# Patient Record
Sex: FEMALE | Race: WHITE | NOT HISPANIC OR LATINO | Employment: PART TIME | URBAN - METROPOLITAN AREA
[De-identification: names, ages, dates, MRNs, and addresses within clinical notes are randomized per-mention and may not be internally consistent; named-entity substitution may affect disease eponyms.]

---

## 2018-12-03 ENCOUNTER — OFFICE VISIT (OUTPATIENT)
Dept: GASTROENTEROLOGY | Facility: CLINIC | Age: 48
End: 2018-12-03

## 2018-12-03 VITALS — HEIGHT: 71 IN | BODY MASS INDEX: 31.5 KG/M2 | WEIGHT: 225 LBS

## 2018-12-03 DIAGNOSIS — K21.00 GASTROESOPHAGEAL REFLUX DISEASE WITH ESOPHAGITIS: Primary | ICD-10-CM

## 2018-12-03 PROCEDURE — 99213 OFFICE O/P EST LOW 20 MIN: CPT | Performed by: INTERNAL MEDICINE

## 2018-12-03 RX ORDER — NORETHINDRONE ACETATE AND ETHINYL ESTRADIOL, ETHINYL ESTRADIOL AND FERROUS FUMARATE 1MG-10(24)
KIT ORAL
COMMUNITY
Start: 2018-11-19

## 2018-12-03 RX ORDER — DULOXETIN HYDROCHLORIDE 60 MG/1
CAPSULE, DELAYED RELEASE ORAL
COMMUNITY
Start: 2018-11-19

## 2018-12-03 RX ORDER — OMEPRAZOLE 40 MG/1
CAPSULE, DELAYED RELEASE ORAL
COMMUNITY
Start: 2018-11-30 | End: 2019-09-30 | Stop reason: SDUPTHER

## 2018-12-03 RX ORDER — LEVOTHYROXINE SODIUM 0.05 MG/1
TABLET ORAL
COMMUNITY
Start: 2018-11-19

## 2018-12-03 NOTE — PROGRESS NOTES
PCP:  Bethanie Myers APRN     Chief Complaint   Patient presents with   • Follow-up     follow up EGD        HPI   The patient is known to me a 47-year-old female with reflux.  Her symptoms have dramatically improved.  She has only had 1 episode since I last saw her.  She is very happy with the Prilosec.  She had a colonoscopy about 10 years ago.  This was for irritable bowel.  She has no first-degree relatives with colorectal neoplasia to her knowledge.  Allergies   Allergen Reactions   • Doxycycline Unknown (See Comments)     Unknown    • Erythromycin    • Penicillins    • Sulfa Antibiotics    • Tetracyclines & Related           Current Outpatient Medications:   •  atenolol (TENORMIN) 50 MG tablet, Take 50 mg by mouth daily., Disp: , Rfl:   •  doxepin (SINEquan) 75 MG capsule, Take 75 mg by mouth every night., Disp: , Rfl:   •  DULoxetine (CYMBALTA) 60 MG capsule, , Disp: , Rfl:   •  levothyroxine (SYNTHROID, LEVOTHROID) 50 MCG tablet, , Disp: , Rfl:   •  LO LOESTRIN FE 1 MG-10 MCG / 10 MCG tablet, , Disp: , Rfl:   •  LORazepam (ATIVAN) 1 MG tablet, Take 1 mg by mouth every 8 (eight) hours as needed for anxiety., Disp: , Rfl:   •  omeprazole (priLOSEC) 40 MG capsule, , Disp: , Rfl:     Past Medical History:   Diagnosis Date   • Anxiety    • Endometriosis    • Hx of skin cancer, basal cell    • Hypertension    • Hypoglycemia    • Hypothyroidism    • Mitral valve prolapse    • Retinal detachment        Past Surgical History:   Procedure Laterality Date   • MOLE REMOVAL     • RETINAL DETACHMENT SURGERY     • TUBAL ABDOMINAL LIGATION          Social History     Socioeconomic History   • Marital status: Single     Spouse name: Not on file   • Number of children: Not on file   • Years of education: Not on file   • Highest education level: Not on file   Social Needs   • Financial resource strain: Not on file   • Food insecurity - worry: Not on file   • Food insecurity - inability: Not on file   • Transportation needs  - medical: Not on file   • Transportation needs - non-medical: Not on file   Occupational History   • Not on file   Tobacco Use   • Smoking status: Former Smoker   Substance and Sexual Activity   • Alcohol use: Yes     Comment: occassional   • Drug use: No   • Sexual activity: Not on file   Other Topics Concern   • Not on file   Social History Narrative   • Not on file        Family History   Problem Relation Age of Onset   • Colon cancer Paternal Grandfather         Review of Systems   Constitutional: Negative for unexpected weight loss.   HENT: Negative for trouble swallowing.    Eyes: Negative.    Respiratory: Negative.    Gastrointestinal: Negative for abdominal distention, abdominal pain, anal bleeding, blood in stool, constipation, diarrhea, nausea, rectal pain, vomiting, GERD and indigestion.   Endocrine: Negative.    Genitourinary: Negative.    Musculoskeletal: Negative.    Skin: Negative.    Allergic/Immunologic: Negative.    Neurological: Negative.    Hematological: Negative.    Psychiatric/Behavioral: Negative.         There were no vitals filed for this visit.     Physical Exam   General Appearance: Alert, in no acute distress   Head: Normocephalic, without obvious abnormality, atraumatic   Eyes: Lids and lashes normal, conjunctivae and sclerae normal, no icterus, no pallor, corneas clear, PERRLA   Extremities: Moves all extremities well, no edema, no cyanosis, no redness   Skin: No bleeding, bruising or rash   Neurologic: Cranial nerves 2 - 12 grossly intact, no focal deficits       Dorene was seen today for follow-up.    Diagnoses and all orders for this visit:    Gastroesophageal reflux disease with esophagitis    She has had a dramatic improvement with the Prilosec.  She does have a small hiatal hernia and we discussed that.  Given the sloughing of the mucosa in the esophagus, I think it would be best at some point in 6 months or year to reevaluate.  It had a very benign appearance however.  She is  symptomatically doing much better.  From a screening standpoint, she will need a colonoscopy as she is age 47.  The new [employer] guidelines are to start screening at age 45.  She will get that scheduled in the next few months.     Keith Washington MD

## 2019-02-13 ENCOUNTER — APPOINTMENT (OUTPATIENT)
Dept: GENERAL RADIOLOGY | Facility: HOSPITAL | Age: 49
End: 2019-02-13

## 2019-02-13 ENCOUNTER — HOSPITAL ENCOUNTER (EMERGENCY)
Facility: HOSPITAL | Age: 49
Discharge: HOME | End: 2019-02-13
Attending: EMERGENCY MEDICINE | Admitting: EMERGENCY MEDICINE

## 2019-02-13 VITALS
TEMPERATURE: 98.1 F | WEIGHT: 230 LBS | HEART RATE: 94 BPM | OXYGEN SATURATION: 99 % | RESPIRATION RATE: 16 BRPM | SYSTOLIC BLOOD PRESSURE: 196 MMHG | HEIGHT: 71 IN | BODY MASS INDEX: 32.2 KG/M2 | DIASTOLIC BLOOD PRESSURE: 108 MMHG

## 2019-02-13 DIAGNOSIS — S80.211A ABRASION, RIGHT KNEE, INITIAL ENCOUNTER: ICD-10-CM

## 2019-02-13 DIAGNOSIS — S81.011A LACERATION OF RIGHT KNEE, INITIAL ENCOUNTER: Primary | ICD-10-CM

## 2019-02-13 DIAGNOSIS — S80.01XA CONTUSION OF RIGHT KNEE, INITIAL ENCOUNTER: ICD-10-CM

## 2019-02-13 PROCEDURE — 73560 X-RAY EXAM OF KNEE 1 OR 2: CPT

## 2019-02-13 PROCEDURE — 99283 EMERGENCY DEPT VISIT LOW MDM: CPT

## 2019-02-13 RX ORDER — HYDROCODONE BITARTRATE AND ACETAMINOPHEN 5; 325 MG/1; MG/1
1 TABLET ORAL ONCE
Status: COMPLETED | OUTPATIENT
Start: 2019-02-13 | End: 2019-02-13

## 2019-02-13 RX ORDER — ONDANSETRON 4 MG/1
4 TABLET, FILM COATED ORAL EVERY 8 HOURS PRN
Qty: 20 TABLET | Refills: 0 | Status: SHIPPED | OUTPATIENT
Start: 2019-02-13

## 2019-02-13 RX ORDER — HYDROCODONE BITARTRATE AND ACETAMINOPHEN 5; 325 MG/1; MG/1
1 TABLET ORAL EVERY 6 HOURS PRN
Qty: 15 TABLET | Refills: 0 | Status: SHIPPED | OUTPATIENT
Start: 2019-02-13 | End: 2020-01-27

## 2019-02-13 RX ORDER — BACITRACIN, NEOMYCIN, POLYMYXIN B 400; 3.5; 5 [USP'U]/G; MG/G; [USP'U]/G
1 OINTMENT TOPICAL ONCE
Status: COMPLETED | OUTPATIENT
Start: 2019-02-13 | End: 2019-02-13

## 2019-02-13 RX ORDER — ONDANSETRON 4 MG/1
4 TABLET, ORALLY DISINTEGRATING ORAL ONCE
Status: COMPLETED | OUTPATIENT
Start: 2019-02-13 | End: 2019-02-13

## 2019-02-13 RX ADMIN — HYDROCODONE BITARTRATE AND ACETAMINOPHEN 1 TABLET: 5; 325 TABLET ORAL at 21:20

## 2019-02-13 RX ADMIN — BACITRACIN, NEOMYCIN, POLYMYXIN B 1 G: 400; 3.5; 5 OINTMENT TOPICAL at 21:47

## 2019-02-13 RX ADMIN — ONDANSETRON 4 MG: 4 TABLET, ORALLY DISINTEGRATING ORAL at 21:20

## 2019-02-14 NOTE — ED PROVIDER NOTES
Subjective   48-year-old female presents to emergency department with laceration/abrasion to her right knee.  She fell, tripping over a loose brick, striking her knee on the edge of a concrete walkway.  Pain with ambulation, no active bleeding currently.  No other symptoms or injuries.        Illness   Location:  R knee  Quality:  Aching / burning  Severity:  Moderate  Onset quality:  Sudden  Duration:  2 hours  Timing:  Constant  Progression:  Waxing and waning  Chronicity:  New  Context:  Per HPI  Relieved by:  Per HPI  Worsened by:  Per HPI  Ineffective treatments:  Per HPI  Associated symptoms: no chest pain, no fever, no headaches, no nausea and no shortness of breath        Review of Systems   Constitutional: Negative for fever.   Respiratory: Negative for shortness of breath.    Cardiovascular: Negative for chest pain.   Gastrointestinal: Negative for nausea.   Musculoskeletal:        R knee injury , abrasion, laceration   Skin: Positive for wound.   Neurological: Negative for headaches.   All other systems reviewed and are negative.      Past Medical History:   Diagnosis Date   • Anxiety    • Endometriosis    • Hx of skin cancer, basal cell    • Hypertension    • Hypoglycemia    • Hypothyroidism    • Mitral valve prolapse    • Retinal detachment        Allergies   Allergen Reactions   • Doxycycline Unknown (See Comments)     Unknown    • Erythromycin    • Penicillins    • Sulfa Antibiotics    • Tetracyclines & Related        Past Surgical History:   Procedure Laterality Date   • MOLE REMOVAL     • RETINAL DETACHMENT SURGERY     • TUBAL ABDOMINAL LIGATION         Family History   Problem Relation Age of Onset   • Colon cancer Paternal Grandfather        Social History     Socioeconomic History   • Marital status: Single     Spouse name: Not on file   • Number of children: Not on file   • Years of education: Not on file   • Highest education level: Not on file   Tobacco Use   • Smoking status: Former Smoker    • Smokeless tobacco: Never Used   Substance and Sexual Activity   • Alcohol use: Yes     Comment: occassional   • Drug use: No           Objective   Physical Exam   Constitutional: She is oriented to person, place, and time. She appears well-developed and well-nourished. No distress.   HENT:   Head: Normocephalic and atraumatic.   Right Ear: External ear normal.   Left Ear: External ear normal.   Nose: Nose normal.   Mouth/Throat: Oropharynx is clear and moist. No oropharyngeal exudate.   Eyes: Conjunctivae and EOM are normal. Pupils are equal, round, and reactive to light. Right eye exhibits no discharge. Left eye exhibits no discharge. No scleral icterus.   Neck: Normal range of motion. Neck supple. No JVD present. No tracheal deviation present. No thyromegaly present.   Cardiovascular: Normal rate.   Pulmonary/Chest: Effort normal. No stridor. No respiratory distress.   Abdominal: Soft. She exhibits no distension.   Musculoskeletal: She exhibits tenderness. She exhibits no edema or deformity.   Right knee with abrasion and soft tissue contusion across the prepatellar aspect, with soft tissue swelling inferiorly and laterally, underlying a 2 cm superficial flap-type linear laceration, partial-thickness.  No bony crepitus or step-off.  Range of motion is relatively full.  She is ambulatory to the bedside.  Neurovascular status intact distally.   Neurological: She is alert and oriented to person, place, and time. No cranial nerve deficit. She exhibits normal muscle tone. Coordination normal.   Skin: Skin is warm and dry. No rash noted. She is not diaphoretic. No erythema. No pallor.   Psychiatric: She has a normal mood and affect. Her behavior is normal. Judgment and thought content normal.   Nursing note and vitals reviewed.      Laceration Repair  Date/Time: 2/13/2019 10:08 PM  Performed by: Kendell Gaines PA-C  Authorized by: Meliton Gonzales MD     Consent:     Consent obtained:  Verbal    Consent given  "by:  Patient    Risks discussed:  Infection  Anesthesia (see MAR for exact dosages):     Anesthesia method:  Local infiltration    Local anesthetic:  Lidocaine 1% w/o epi  Laceration details:     Location: Right knee.    Length (cm):  2    Depth (mm):  2  Exploration:     Wound exploration: wound explored through full range of motion      Wound extent: no muscle damage noted and no underlying fracture noted    Treatment:     Area cleansed with:  Betadine    Amount of cleaning:  Standard    Irrigation solution:  Sterile saline    Irrigation volume:  20    Irrigation method:  Syringe    Visualized foreign bodies/material removed: no    Skin repair:     Repair method:  Sutures    Suture size:  5-0    Suture material:  Nylon    Suture technique:  Simple interrupted    Number of sutures:  4  Approximation:     Approximation:  Close    Vermilion border: well-aligned    Post-procedure details:     Dressing:  Antibiotic ointment and non-adherent dressing    Patient tolerance of procedure:  Tolerated well, no immediate complications               ED Course        No results found for this or any previous visit (from the past 24 hour(s)).  Note: In addition to lab results from this visit, the labs listed above may include labs taken at another facility or during a different encounter within the last 24 hours. Please correlate lab times with ED admission and discharge times for further clarification of the services performed during this visit.    XR Knee 1 or 2 View Right   Final Result   No acute fracture or dislocation.       THIS DOCUMENT HAS BEEN ELECTRONICALLY SIGNED BY MICHAEL CALIXTO MD        Vitals:    02/13/19 1952   BP: (!) 196/108   BP Location: Left arm   Patient Position: Sitting   Pulse: 94   Resp: 16   Temp: 98.1 °F (36.7 °C)   TempSrc: Oral   SpO2: 99%   Weight: 104 kg (230 lb)   Height: 180.3 cm (71\")     Medications   HYDROcodone-acetaminophen (NORCO) 5-325 MG per tablet 1 tablet (1 tablet Oral Given 2/13/19 " 2120)   ondansetron ODT (ZOFRAN-ODT) disintegrating tablet 4 mg (4 mg Oral Given 2/13/19 2120)   neomycin-bacitracin-polymyxin (NEOSPORIN) ointment 1 g (1 g Topical Given 2/13/19 2147)     ECG/EMG Results (last 24 hours)     ** No results found for the last 24 hours. **        No orders to display               MDM      Final diagnoses:   Laceration of right knee, initial encounter   Abrasion, right knee, initial encounter   Contusion of right knee, initial encounter            Kendell Gaines PA-C  02/13/19 [id number]

## 2019-02-14 NOTE — DISCHARGE INSTRUCTIONS
Keep wound clean and dry.  Apply cold compresses every few hours for 20-30 minutes.  Recheck in 2 days, suture removal in 7-10 days.  Observe for any sign of infection including but not limited to redness pus pain swelling fever red streaks, increased pain with weightbearing, drainage.  Return to emergency department immediately if any change or worsening of symptoms.

## 2019-02-24 ENCOUNTER — HOSPITAL ENCOUNTER (EMERGENCY)
Facility: HOSPITAL | Age: 49
Discharge: HOME | End: 2019-02-24

## 2019-02-24 VITALS
RESPIRATION RATE: 20 BRPM | BODY MASS INDEX: 32.2 KG/M2 | WEIGHT: 230 LBS | OXYGEN SATURATION: 100 % | DIASTOLIC BLOOD PRESSURE: 99 MMHG | HEIGHT: 71 IN | HEART RATE: 102 BPM | SYSTOLIC BLOOD PRESSURE: 185 MMHG | TEMPERATURE: 98.5 F

## 2019-02-24 PROCEDURE — 99201: CPT

## 2019-03-25 RX ORDER — SODIUM, POTASSIUM,MAG SULFATES 17.5-3.13G
SOLUTION, RECONSTITUTED, ORAL ORAL
Qty: 1 BOTTLE | Refills: 0 | Status: SHIPPED | OUTPATIENT
Start: 2019-03-25 | End: 2020-01-27

## 2019-04-02 ENCOUNTER — OUTSIDE FACILITY SERVICE (OUTPATIENT)
Dept: GASTROENTEROLOGY | Facility: CLINIC | Age: 49
End: 2019-04-02

## 2019-04-02 PROCEDURE — G0105 COLORECTAL SCRN; HI RISK IND: HCPCS | Performed by: INTERNAL MEDICINE

## 2019-09-30 RX ORDER — OMEPRAZOLE 40 MG/1
CAPSULE, DELAYED RELEASE ORAL
Qty: 30 CAPSULE | Refills: 6 | Status: SHIPPED | OUTPATIENT
Start: 2019-09-30 | End: 2020-07-02

## 2020-01-27 ENCOUNTER — LAB (OUTPATIENT)
Dept: LAB | Facility: HOSPITAL | Age: 50
End: 2020-01-27

## 2020-01-27 ENCOUNTER — OFFICE VISIT (OUTPATIENT)
Dept: GASTROENTEROLOGY | Facility: CLINIC | Age: 50
End: 2020-01-27

## 2020-01-27 VITALS
WEIGHT: 239 LBS | DIASTOLIC BLOOD PRESSURE: 99 MMHG | HEART RATE: 82 BPM | SYSTOLIC BLOOD PRESSURE: 178 MMHG | BODY MASS INDEX: 33.33 KG/M2

## 2020-01-27 DIAGNOSIS — K76.0 FATTY LIVER: ICD-10-CM

## 2020-01-27 DIAGNOSIS — K20.80 ESOPHAGITIS DISSECANS SUPERFICIALIS: ICD-10-CM

## 2020-01-27 DIAGNOSIS — R79.89 ELEVATED LIVER FUNCTION TESTS: Primary | ICD-10-CM

## 2020-01-27 DIAGNOSIS — K21.9 GASTROESOPHAGEAL REFLUX DISEASE, ESOPHAGITIS PRESENCE NOT SPECIFIED: ICD-10-CM

## 2020-01-27 DIAGNOSIS — R79.89 ELEVATED LIVER FUNCTION TESTS: ICD-10-CM

## 2020-01-27 LAB
ALBUMIN SERPL-MCNC: 4.4 G/DL (ref 3.5–5.2)
ALP SERPL-CCNC: 140 U/L (ref 39–117)
ALT SERPL W P-5'-P-CCNC: 41 U/L (ref 1–33)
AST SERPL-CCNC: 30 U/L (ref 1–32)
BILIRUB CONJ SERPL-MCNC: <0.2 MG/DL (ref 0.2–0.3)
BILIRUB INDIRECT SERPL-MCNC: ABNORMAL MG/DL
BILIRUB SERPL-MCNC: <0.2 MG/DL (ref 0.2–1.2)
CERULOPLASMIN SERPL-MCNC: 42 MG/DL (ref 19–39)
FERRITIN SERPL-MCNC: 127 NG/ML (ref 13–150)
GGT SERPL-CCNC: 140 U/L (ref 5–36)
IRON 24H UR-MRATE: 89 MCG/DL (ref 37–145)
PROT SERPL-MCNC: 7.6 G/DL (ref 6–8.5)

## 2020-01-27 PROCEDURE — 86235 NUCLEAR ANTIGEN ANTIBODY: CPT

## 2020-01-27 PROCEDURE — 86038 ANTINUCLEAR ANTIBODIES: CPT

## 2020-01-27 PROCEDURE — 36415 COLL VENOUS BLD VENIPUNCTURE: CPT

## 2020-01-27 PROCEDURE — 83540 ASSAY OF IRON: CPT

## 2020-01-27 PROCEDURE — 82728 ASSAY OF FERRITIN: CPT

## 2020-01-27 PROCEDURE — 83516 IMMUNOASSAY NONANTIBODY: CPT

## 2020-01-27 PROCEDURE — 86704 HEP B CORE ANTIBODY TOTAL: CPT

## 2020-01-27 PROCEDURE — 82977 ASSAY OF GGT: CPT

## 2020-01-27 PROCEDURE — 87340 HEPATITIS B SURFACE AG IA: CPT

## 2020-01-27 PROCEDURE — 86708 HEPATITIS A ANTIBODY: CPT

## 2020-01-27 PROCEDURE — 84466 ASSAY OF TRANSFERRIN: CPT

## 2020-01-27 PROCEDURE — 82103 ALPHA-1-ANTITRYPSIN TOTAL: CPT

## 2020-01-27 PROCEDURE — 80076 HEPATIC FUNCTION PANEL: CPT | Performed by: INTERNAL MEDICINE

## 2020-01-27 PROCEDURE — 86376 MICROSOMAL ANTIBODY EACH: CPT

## 2020-01-27 PROCEDURE — 86225 DNA ANTIBODY NATIVE: CPT

## 2020-01-27 PROCEDURE — 82104 ALPHA-1-ANTITRYPSIN PHENO: CPT

## 2020-01-27 PROCEDURE — 82390 ASSAY OF CERULOPLASMIN: CPT

## 2020-01-27 PROCEDURE — 86706 HEP B SURFACE ANTIBODY: CPT

## 2020-01-27 PROCEDURE — 99214 OFFICE O/P EST MOD 30 MIN: CPT | Performed by: INTERNAL MEDICINE

## 2020-01-27 PROCEDURE — 86803 HEPATITIS C AB TEST: CPT

## 2020-01-27 NOTE — PROGRESS NOTES
PCP:  Bethanie Myers, TERRY     No referring provider defined for this encounter.    Chief Complaint   Patient presents with   • Follow-up     follow up GERD        HPI   Patient has several issues.  She has gastroesophageal reflux disease which appears to be fairly well controlled at the moment.  She is on omeprazole.  She did have some esophagitis dissecans and some blunting of the mucosa superficially.  In September she had a cholecystectomy for sludge and what sounds like a dysfunctional gallbladder.  Her liver chemistries have been noted to be elevated.  In September 2019 her bilirubin was normal at 0.4.  Her alkaline phosphatase was elevated at 119.  Her AST was 41.  Her ALT was 43.  On 11/13/2019 her bilirubin was normal at 0.3.  Her alkaline phosphatase was 139.  Her ALT was 40.  Her AST was 33.  On 1/7/2020 her ALT was 45, AST 39, alk phos 131, albumin 4.5, and bilirubin 0.4.  She has no history of IV drug use, homemade tattoos, transfusions, or exposure to someone with hepatitis.  She believes she had hepatitis vaccination.  Her hemoglobin was 12.9 hematocrit 39.4 and white count 7.5 thousand.  She did have an ultrasound which showed fatty liver.  She is post cholecystectomy.  Her recent colonoscopy was normal and she is due for reevaluation in April of 2024.    Allergies   Allergen Reactions   • Doxycycline Unknown (See Comments)     Unknown    • Erythromycin    • Penicillins    • Sulfa Antibiotics    • Tetracyclines & Related           Current Outpatient Medications:   •  atenolol (TENORMIN) 50 MG tablet, Take 50 mg by mouth daily., Disp: , Rfl:   •  doxepin (SINEquan) 75 MG capsule, Take 75 mg by mouth every night., Disp: , Rfl:   •  DULoxetine (CYMBALTA) 60 MG capsule, , Disp: , Rfl:   •  levothyroxine (SYNTHROID, LEVOTHROID) 50 MCG tablet, , Disp: , Rfl:   •  LO LOESTRIN FE 1 MG-10 MCG / 10 MCG tablet, , Disp: , Rfl:   •  LORazepam (ATIVAN) 1 MG tablet, Take 1 mg by mouth every 8 (eight) hours as  needed for anxiety., Disp: , Rfl:   •  omeprazole (priLOSEC) 40 MG capsule, Take 1 by mouth daily 30 minutes before a meal, Disp: 30 capsule, Rfl: 6  •  ondansetron (ZOFRAN) 4 MG tablet, Take 1 tablet by mouth Every 8 (Eight) Hours As Needed for Nausea or Vomiting., Disp: 20 tablet, Rfl: 0     Past Medical History:   Diagnosis Date   • Anxiety    • Endometriosis    • Hx of skin cancer, basal cell    • Hypertension    • Hypoglycemia    • Hypothyroidism    • Mitral valve prolapse    • Retinal detachment        Past Surgical History:   Procedure Laterality Date   • MOLE REMOVAL     • RETINAL DETACHMENT SURGERY     • TUBAL ABDOMINAL LIGATION          Social History     Socioeconomic History   • Marital status: Single     Spouse name: Not on file   • Number of children: Not on file   • Years of education: Not on file   • Highest education level: Not on file   Tobacco Use   • Smoking status: Former Smoker   • Smokeless tobacco: Never Used   Substance and Sexual Activity   • Alcohol use: Yes     Comment: occassional   • Drug use: No        Family History   Problem Relation Age of Onset   • Colon cancer Paternal Grandfather         Review of Systems   Constitutional: Positive for diaphoresis.   HENT: Negative for trouble swallowing and voice change.    Gastrointestinal: Positive for diarrhea and GERD.        Vitals:    01/27/20 1452   BP: 178/99   Pulse: 82        Physical Exam   General Appearance: Alert, in no acute distress   Head: Normocephalic, without obvious abnormality, atraumatic   Eyes: Lids and lashes normal, conjunctivae and sclerae normal, no icterus, no pallor, corneas clear, PERRLA   Ears: Ears appear intact with no abnormalities noted   Extremities: Moves all extremities well, no edema, no cyanosis, no redness   Skin: No bleeding, bruising or rash   Neurologic: Cranial nerves 2 - 12 grossly intact, no focal deficits     Review of systems was reviewed and positives are noted. All of the remaining review of  systems in that system are negative.    Dorene was seen today for follow-up.    Diagnoses and all orders for this visit:    Elevated liver function tests  -     Gamma GT; Future  -     Mitochondrial Antibodies, M2; Future  -     Hepatitis C Antibody; Future  -     Hepatitis B Surface Antigen; Future  -     Hepatitis B Surface Antibody; Future  -     Hepatitis B Core Antibody, Total; Future  -     Hepatitis A Antibody, Total; Future  -     Iron; Future  -     Soluble Liver Ag (IgG Ab); Future  -     Transferrin Saturation; Future  -     Ferritin; Future  -     Ceruloplasmin; Future  -     Alpha - 1 - Antitrypsin Phenotype; Future  -     Anti-Smooth Muscle Antibody Titer; Future  -     Anti-microsomal Antibody; Future  -     SHANICE by IFA, Reflex 9-biomarkers profile; Future  -     Hepatic Function Panel    Fatty liver    Gastroesophageal reflux disease, esophagitis presence not specified    Esophagitis dissecans superficialis    Impressions and plan #1 elevated liver chemistries: Certainly this could be related to nonalcoholic fatty liver disease.  We will check for other forms of liver disease.  We will check for hepatitis B, hepatitis C, autoimmune disease, and primary biliary cirrhosis.  We will check for hereditary diseases as well.  I have stressed modest weight reduction.  We typically talk in the range of 5 to 10% of body weight.  In her case that would be about 12 pounds.  Might be nice to get a [place] fibrosure as well.    #2 gastroesophageal reflux disease: Her symptoms appear to be better controlled.  We are going to reevaluate the esophagus.  I would hope that the mucosa looked healthier on this evaluation.  I do not think it is urgent to reevaluate but given its appearance we would both feel better reevaluating to be sure things are improving.    Keith Washington MD

## 2020-01-28 LAB
HBV SURFACE AB SER RIA-ACNC: NORMAL
HBV SURFACE AG SERPL QL IA: NORMAL
HCV AB SER DONR QL: NORMAL
HOLD SPECIMEN: NORMAL

## 2020-01-29 LAB
ACTIN IGG SERPL-ACNC: 9 UNITS (ref 0–19)
ALP LIVER CFR SERPL: 2.7 UNITS (ref 0–20)
DEPRECATED MITOCHONDRIA M2 IGG SER-ACNC: <20 UNITS (ref 0–20)
HAV AB SER QL IA: POSITIVE
HBV CORE AB SER DONR QL IA: NEGATIVE

## 2020-01-30 ENCOUNTER — OUTSIDE FACILITY SERVICE (OUTPATIENT)
Dept: GASTROENTEROLOGY | Facility: CLINIC | Age: 50
End: 2020-01-30

## 2020-01-30 LAB
ANA HOMOGEN TITR SER: ABNORMAL {TITER}
ANA SER QL IA: POSITIVE
CENTROMERE B AB SER-ACNC: <0.2 AI (ref 0–0.9)
CHROMATIN AB SERPL-ACNC: 0.2 AI (ref 0–0.9)
CONV TRANSFERRIN SAT: 18 % SATURATION
DSDNA AB SER-ACNC: <1 IU/ML (ref 0–9)
ENA JO1 AB SER-ACNC: <0.2 AI (ref 0–0.9)
ENA RNP AB SER-ACNC: 0.9 AI (ref 0–0.9)
ENA SCL70 AB SER-ACNC: <0.2 AI (ref 0–0.9)
ENA SM AB SER-ACNC: 0.2 AI (ref 0–0.9)
ENA SS-A AB SER-ACNC: <0.2 AI (ref 0–0.9)
ENA SS-B AB SER-ACNC: <0.2 AI (ref 0–0.9)
IRON SERPL-MCNC: 92 UG/DL
Lab: ABNORMAL
SOLUBLE LIVER IGG SER IA-ACNC: 3.5 UNITS (ref 0–20)
TRANSFERRIN SERPL-MCNC: 359 MG/DL

## 2020-01-30 PROCEDURE — 43235 EGD DIAGNOSTIC BRUSH WASH: CPT | Performed by: INTERNAL MEDICINE

## 2020-02-03 LAB
A1AT SERPL-MCNC: 180 MG/DL (ref 101–187)
PHENOTYPE: NORMAL

## 2020-07-02 RX ORDER — OMEPRAZOLE 40 MG/1
CAPSULE, DELAYED RELEASE ORAL
Qty: 90 CAPSULE | Refills: 3 | Status: SHIPPED | OUTPATIENT
Start: 2020-07-02 | End: 2021-07-29

## 2021-07-29 RX ORDER — OMEPRAZOLE 40 MG/1
CAPSULE, DELAYED RELEASE ORAL
Qty: 90 CAPSULE | Refills: 0 | Status: SHIPPED | OUTPATIENT
Start: 2021-07-29 | End: 2021-10-21

## 2021-10-21 RX ORDER — OMEPRAZOLE 40 MG/1
CAPSULE, DELAYED RELEASE ORAL
Qty: 90 CAPSULE | Refills: 0 | Status: SHIPPED | OUTPATIENT
Start: 2021-10-21 | End: 2022-01-25

## 2022-01-25 RX ORDER — OMEPRAZOLE 40 MG/1
CAPSULE, DELAYED RELEASE ORAL
Qty: 30 CAPSULE | Refills: 0 | Status: SHIPPED | OUTPATIENT
Start: 2022-01-25 | End: 2022-03-21 | Stop reason: SDUPTHER

## 2022-02-23 RX ORDER — OMEPRAZOLE 40 MG/1
CAPSULE, DELAYED RELEASE ORAL
Qty: 30 CAPSULE | Refills: 0 | OUTPATIENT
Start: 2022-02-23

## 2022-03-21 ENCOUNTER — OFFICE VISIT (OUTPATIENT)
Dept: GASTROENTEROLOGY | Facility: CLINIC | Age: 52
End: 2022-03-21

## 2022-03-21 VITALS
TEMPERATURE: 96.8 F | BODY MASS INDEX: 34.13 KG/M2 | HEART RATE: 88 BPM | HEIGHT: 71 IN | SYSTOLIC BLOOD PRESSURE: 161 MMHG | WEIGHT: 243.8 LBS | DIASTOLIC BLOOD PRESSURE: 88 MMHG

## 2022-03-21 DIAGNOSIS — K21.9 GASTROESOPHAGEAL REFLUX DISEASE, UNSPECIFIED WHETHER ESOPHAGITIS PRESENT: Primary | ICD-10-CM

## 2022-03-21 PROCEDURE — 99212 OFFICE O/P EST SF 10 MIN: CPT | Performed by: NURSE PRACTITIONER

## 2022-03-21 RX ORDER — METFORMIN HYDROCHLORIDE 500 MG/1
500 TABLET, EXTENDED RELEASE ORAL 2 TIMES DAILY
COMMUNITY

## 2022-03-21 RX ORDER — LANCING DEVICE/LANCETS
KIT MISCELLANEOUS
COMMUNITY

## 2022-03-21 RX ORDER — OMEPRAZOLE 40 MG/1
40 CAPSULE, DELAYED RELEASE ORAL DAILY
Qty: 90 CAPSULE | Refills: 3 | Status: SHIPPED | OUTPATIENT
Start: 2022-03-21

## 2022-03-21 RX ORDER — FLUTICASONE PROPIONATE 50 MCG
SPRAY, SUSPENSION (ML) NASAL
COMMUNITY

## 2022-03-21 RX ORDER — BUPROPION HYDROCHLORIDE 150 MG/1
150 TABLET ORAL 2 TIMES DAILY
COMMUNITY

## 2023-05-04 RX ORDER — OMEPRAZOLE 40 MG/1
CAPSULE, DELAYED RELEASE ORAL
Qty: 30 CAPSULE | Refills: 0 | Status: SHIPPED | OUTPATIENT
Start: 2023-05-04

## 2023-05-04 NOTE — TELEPHONE ENCOUNTER
Rx Refill Note  Requested Prescriptions     Pending Prescriptions Disp Refills   • omeprazole (priLOSEC) 40 MG capsule [Pharmacy Med Name: OMEPRAZOLE DR 40 MG CAPSULE+] 30 capsule 0     Sig: TAKE (1) CAPSULE BY MOUTH EVERY DAY.      Last office visit with prescribing clinician: 3/21/2022   Last telemedicine visit with prescribing clinician: 5/8/2023   Next office visit with prescribing clinician: 5/8/2023                         Would you like a call back once the refill request has been completed: [] Yes [] No    If the office needs to give you a call back, can they leave a voicemail: [] Yes [] No    Karuna Celestin LPN  05/04/23, 13:36 EDT

## 2023-05-31 ENCOUNTER — OFFICE VISIT (OUTPATIENT)
Dept: GASTROENTEROLOGY | Facility: CLINIC | Age: 53
End: 2023-05-31

## 2023-05-31 VITALS
BODY MASS INDEX: 34.22 KG/M2 | HEIGHT: 71 IN | TEMPERATURE: 97.1 F | SYSTOLIC BLOOD PRESSURE: 135 MMHG | HEART RATE: 89 BPM | DIASTOLIC BLOOD PRESSURE: 82 MMHG | WEIGHT: 244.4 LBS

## 2023-05-31 DIAGNOSIS — K59.00 CONSTIPATION, UNSPECIFIED CONSTIPATION TYPE: Primary | ICD-10-CM

## 2023-05-31 DIAGNOSIS — K21.9 GASTROESOPHAGEAL REFLUX DISEASE, UNSPECIFIED WHETHER ESOPHAGITIS PRESENT: ICD-10-CM

## 2023-05-31 DIAGNOSIS — K31.84 GASTROPARESIS: ICD-10-CM

## 2023-05-31 PROCEDURE — 99214 OFFICE O/P EST MOD 30 MIN: CPT | Performed by: NURSE PRACTITIONER

## 2023-05-31 RX ORDER — OMEPRAZOLE 40 MG/1
40 CAPSULE, DELAYED RELEASE ORAL DAILY
Qty: 90 CAPSULE | Refills: 3 | Status: SHIPPED | OUTPATIENT
Start: 2023-05-31

## 2023-05-31 RX ORDER — VALACYCLOVIR HYDROCHLORIDE 500 MG/1
500 TABLET, FILM COATED ORAL
COMMUNITY

## 2023-05-31 RX ORDER — SODIUM FLUORIDE 6 MG/ML
PASTE, DENTIFRICE DENTAL
COMMUNITY
Start: 2023-04-10

## 2023-05-31 RX ORDER — LUBIPROSTONE 24 UG/1
24 CAPSULE ORAL 2 TIMES DAILY WITH MEALS
Qty: 60 CAPSULE | Refills: 5 | Status: SHIPPED | OUTPATIENT
Start: 2023-05-31

## 2023-05-31 NOTE — PROGRESS NOTES
GASTROENTEROLOGY OFFICE NOTE  Dorene Mcgill  [id number]  1970    CARE TEAM  Patient Care Team:  Bethanie Myers APRN as PCP - General (Family Medicine)    Referring Provider: Bethanie Myers APRN    Chief Complaint   Patient presents with   • Abdominal Pain   • Nausea        HISTORY OF PRESENT ILLNESS:  Patient is a 52-year-old female with medical history that includes GERD, fatty liver, diabetes, hypothyroidism presenting today with new onset of epigastric pain that has now resolved.    She is currently on omeprazole 40 mg daily that continues to control her acid reflux.  About 2 weeks ago, an hour or so after eating a Sub sandwich she developed epigastric pain/pain in her upper abdomen that is difficult to explain but felt like a fullness, pressure sensation that was quite severe.  She had associated nausea and vomited 1 time.  Although the pain and nausea lessened in severity over time it seemed to stick with her for several days.  She saw her primary care physician who did an x-ray that was normal with the exception of a nonspecific gas pattern.    She reports that her bowel habits are a little slower than usual, this has been a problem has been increasing for her over the past several months.  She has a bowel movement most every day although it is very small volume.    PAST MEDICAL HISTORY  Past Medical History:   Diagnosis Date   • Anxiety    • Endometriosis    • Hx of skin cancer, basal cell    • Hypertension    • Hypoglycemia    • Hypothyroidism    • Mitral valve prolapse    • Retinal detachment         PAST SURGICAL HISTORY  Past Surgical History:   Procedure Laterality Date   • CHOLECYSTECTOMY     • MOLE REMOVAL     • RETINAL DETACHMENT SURGERY     • TUBAL ABDOMINAL LIGATION          MEDICATIONS:    Current Outpatient Medications:   •  atenolol (TENORMIN) 50 MG tablet, Take 1 tablet by mouth Daily., Disp: , Rfl:   •  buPROPion XL (WELLBUTRIN XL) 150 MG 24 hr tablet, Take 1 tablet by mouth 2  (Two) Times a Day., Disp: , Rfl:   •  doxepin (SINEquan) 75 MG capsule, Take 1 capsule by mouth Every Night., Disp: , Rfl:   •  DULoxetine (CYMBALTA) 60 MG capsule, , Disp: , Rfl:   •  glucose blood test strip, Accu-Chek Guide Me Glucose Meter, Disp: , Rfl:   •  glucose blood test strip, Accu-Chek Guide test strips, Disp: , Rfl:   •  Lancets Misc. (Accu-Chek FastClix Lancet) kit, Accu-Chek Fastclix Lancet Drum, Disp: , Rfl:   •  levothyroxine (SYNTHROID, LEVOTHROID) 50 MCG tablet, , Disp: , Rfl:   •  LORazepam (ATIVAN) 1 MG tablet, Take 1 tablet by mouth Every 8 (Eight) Hours As Needed for Anxiety., Disp: , Rfl:   •  metFORMIN ER (GLUCOPHAGE-XR) 500 MG 24 hr tablet, Take 1 tablet by mouth 2 (Two) Times a Day., Disp: , Rfl:   •  ondansetron (ZOFRAN) 4 MG tablet, Take 1 tablet by mouth Every 8 (Eight) Hours As Needed for Nausea or Vomiting., Disp: 20 tablet, Rfl: 0  •  PreviDent 5000 Booster Plus 1.1 % paste, , Disp: , Rfl:   •  COVID-19 Test kit, ID NOW COVID-19 Test Kit  TEST AS DIRECTED (Patient not taking: Reported on 5/31/2023), Disp: , Rfl:   •  lubiprostone (Amitiza) 24 MCG capsule, Take 1 capsule by mouth 2 (Two) Times a Day With Meals., Disp: 60 capsule, Rfl: 5  •  omeprazole (priLOSEC) 40 MG capsule, Take 1 capsule by mouth Daily., Disp: 90 capsule, Rfl: 3  •  valACYclovir (VALTREX) 500 MG tablet, Take 1 tablet by mouth. (Patient not taking: Reported on 5/31/2023), Disp: , Rfl:     ALLERGIES  Allergies   Allergen Reactions   • Doxycycline Unknown (See Comments)     Unknown    • Erythromycin    • Penicillins    • Sulfa Antibiotics    • Tetracyclines & Related        FAMILY HISTORY:  Family History   Problem Relation Age of Onset   • Colon cancer Paternal Grandfather        SOCIAL HISTORY  Social History     Socioeconomic History   • Marital status: Single   Tobacco Use   • Smoking status: Former   • Smokeless tobacco: Never   Vaping Use   • Vaping Use: Never used   Substance and Sexual Activity   • Alcohol  "use: Yes     Comment: occassional   • Drug use: No   • Sexual activity: Defer         PHYSICAL EXAM   /82 (BP Location: Left arm, Patient Position: Sitting, Cuff Size: Adult)   Pulse 89   Temp 97.1 °F (36.2 °C) (Temporal)   Ht 180.3 cm (71\")   Wt 111 kg (244 lb 6.4 oz)   BMI 34.09 kg/m²   Physical Exam  Constitutional:       Appearance: Normal appearance.   HENT:      Head: Normocephalic and atraumatic.   Pulmonary:      Effort: Pulmonary effort is normal.   Abdominal:      General: Bowel sounds are normal. There is no distension.      Palpations: Abdomen is soft.      Tenderness: There is no abdominal tenderness.   Neurological:      Mental Status: She is alert and oriented to person, place, and time.   Psychiatric:         Mood and Affect: Mood normal.         Thought Content: Thought content normal.           Results Review:  Images reviewed, interpreted, and dictated by Dr. Soy Tipton.   Transcribed by HARMAN Pillai (CHATA).  Narrative    THREE VIEW ABDOMEN WITH CHEST.    5/25/2023 2:24 PM     HISTORY:  Epigastric pain.     COMPARISON:  None.     FINDINGS:     Chest: The cardiac silhouette is normal.  The mediastinum is   unremarkable.  The lungs are clear.  There is no pneumothorax. There is   approximately 20 degrees of thoracic scoliosis convex to the left.     Abdomen: Flat and upright views of the abdomen demonstrate  a   nonspecific bowel gas pattern.  There is no free air. There are no   abnormal calcifications.  Exam End: 05/25/23 14:36         ASSESSMENT / PLAN  Diagnoses and all orders for this visit:    1. Constipation, unspecified constipation type (Primary)  -     lubiprostone (Amitiza) 24 MCG capsule; Take 1 capsule by mouth 2 (Two) Times a Day With Meals.  Dispense: 60 capsule; Refill: 5    2. Gastroesophageal reflux disease, unspecified whether esophagitis present  -     omeprazole (priLOSEC) 40 MG capsule; Take 1 capsule by mouth Daily.  Dispense: 90 capsule; Refill: 3    3. " Gastroparesis    >> Suspect the etiology of her recent pain was related to gastroparesis.  Gastroparesis nutrition therapy discussed with patient:   -Eat small, frequent meals.    -Avoid foods that are high in fat.    -Do not eat foods high in fiber or take fiber supplements or fiber bulking agents.  -Do not eat foods that increase acid reflux such as acidic, spicy, fried or greasy foods.    -Do not drink alcohol or smoke.    -Do not drink carbonated beverages, as they increase bloating.    -If symptoms are severe, semisolid foods or liquids may need to be your main food sources.  Choose liquid nutritional supplements that have less than or equal to 2 g of fiber per serving.    -Sit upright while eating and sit upright or walk after meals.    -Do not lie down for 3 to 4 hours after eating to avoid reflux or regurgitation.  - Educational handout including dietary tips, foods recommended, foods not recommended and sample menu provided to patient      Return in about 6 months (around 11/30/2023).    I discussed the patients findings and my recommendations with patient    TERRY Galarza

## 2024-12-05 ENCOUNTER — TELEPHONE (OUTPATIENT)
Dept: GASTROENTEROLOGY | Facility: CLINIC | Age: 54
End: 2024-12-05
Payer: COMMERCIAL

## 2024-12-05 RX ORDER — SOD SULF/POT CHLORIDE/MAG SULF 1.479 G
1 TABLET ORAL ONCE
Qty: 24 TABLET | Refills: 0 | Status: SHIPPED | OUTPATIENT
Start: 2024-12-05 | End: 2024-12-05

## 2024-12-05 RX ORDER — SODIUM, POTASSIUM,MAG SULFATES 17.5-3.13G
SOLUTION, RECONSTITUTED, ORAL ORAL
Qty: 354 ML | Refills: 0 | Status: SHIPPED | OUTPATIENT
Start: 2024-12-05

## 2024-12-16 ENCOUNTER — OUTSIDE FACILITY SERVICE (OUTPATIENT)
Dept: GASTROENTEROLOGY | Facility: CLINIC | Age: 54
End: 2024-12-16
Payer: COMMERCIAL